# Patient Record
Sex: MALE | Race: WHITE | URBAN - METROPOLITAN AREA
[De-identification: names, ages, dates, MRNs, and addresses within clinical notes are randomized per-mention and may not be internally consistent; named-entity substitution may affect disease eponyms.]

---

## 2018-06-06 ENCOUNTER — TESTING ONLY (OUTPATIENT)
Dept: URBAN - METROPOLITAN AREA CLINIC 76 | Facility: CLINIC | Age: 63
End: 2018-06-06

## 2018-06-06 DIAGNOSIS — H52.13 MYOPIA, BILATERAL: Primary | ICD-10-CM

## 2018-06-06 PROCEDURE — V2799 MISC VISION ITEM OR SERVICE: HCPCS | Performed by: OPTOMETRIST

## 2018-10-03 ENCOUNTER — OFFICE VISIT (OUTPATIENT)
Dept: URBAN - METROPOLITAN AREA CLINIC 76 | Facility: CLINIC | Age: 63
End: 2018-10-03
Payer: MEDICARE

## 2018-10-03 DIAGNOSIS — G24.5 BLEPHAROSPASM: ICD-10-CM

## 2018-10-03 PROCEDURE — 99213 OFFICE O/P EST LOW 20 MIN: CPT | Performed by: OPHTHALMOLOGY

## 2018-10-03 ASSESSMENT — INTRAOCULAR PRESSURE
OS: 12
OD: 12

## 2018-10-03 NOTE — IMPRESSION/PLAN
Impression: Primary open-angle glaucoma, bilateral, moderate stage: B63.6572. OU
IOP OU good today
slight diurnal variation Plan: Continue to monitor. Continue Brimonidine BID OU, Latanoprost QHS OU. Advised medication and follow-up compliance.

## 2018-10-03 NOTE — IMPRESSION/PLAN
Impression: Diagnosis: Age-related nuclear cataract, bilateral. Code: H25.13. Plan: Continue to monitor.

## 2019-07-11 ENCOUNTER — OFFICE VISIT (OUTPATIENT)
Dept: URBAN - METROPOLITAN AREA CLINIC 76 | Facility: CLINIC | Age: 64
End: 2019-07-11
Payer: MEDICARE

## 2019-07-11 PROCEDURE — 92014 COMPRE OPH EXAM EST PT 1/>: CPT | Performed by: OPTOMETRIST

## 2019-07-11 RX ORDER — LATANOPROST 50 UG/ML
0.005 % SOLUTION OPHTHALMIC
Qty: 2.5 | Refills: 3 | Status: INACTIVE
Start: 2019-07-11 | End: 2020-02-12

## 2019-07-11 RX ORDER — BRIMONIDINE TARTRATE 2 MG/ML
0.2 % SOLUTION/ DROPS OPHTHALMIC
Qty: 1 | Refills: 6 | Status: INACTIVE
Start: 2019-07-11 | End: 2020-02-12

## 2019-07-11 ASSESSMENT — INTRAOCULAR PRESSURE
OS: 25
OD: 24

## 2019-07-11 ASSESSMENT — KERATOMETRY
OS: 42.88
OD: 43.63

## 2019-07-11 ASSESSMENT — VISUAL ACUITY
OD: 20/20
OS: 20/25

## 2019-07-11 NOTE — IMPRESSION/PLAN
Impression: Myopia, bilateral: H52.13. OU. Plan: Discussed condition. New mrx given today for glasses and ctls, ok to finalize if pt is happy with strength and comfort. Pt to call with any concerns.

## 2019-07-11 NOTE — IMPRESSION/PLAN
Impression: Primary open-angle glaucoma, bilateral, moderate stage: B49.1471. OU
IOP OU good today
slight diurnal variation Plan: Continue to monitor. Continue Brimonidine BID OU, Latanoprost QHS OU. Advised medication and follow-up compliance, keeping all appt with Dr. Evelyn Whitmore. Called in refills today.

## 2021-05-04 ENCOUNTER — OFFICE VISIT (OUTPATIENT)
Dept: URBAN - METROPOLITAN AREA CLINIC 76 | Facility: CLINIC | Age: 66
End: 2021-05-04
Payer: MEDICARE

## 2021-05-04 DIAGNOSIS — H05.20 EXOPHTHALMOS: ICD-10-CM

## 2021-05-04 PROCEDURE — 92014 COMPRE OPH EXAM EST PT 1/>: CPT | Performed by: OPTOMETRIST

## 2021-05-04 RX ORDER — LATANOPROST 50 UG/ML
0.005 % SOLUTION OPHTHALMIC
Qty: 7.5 | Refills: 1 | Status: INACTIVE
Start: 2021-05-04 | End: 2021-11-03

## 2021-05-04 RX ORDER — BRIMONIDINE TARTRATE 2 MG/ML
0.2 % SOLUTION/ DROPS OPHTHALMIC
Qty: 15 | Refills: 1 | Status: INACTIVE
Start: 2021-05-04 | End: 2021-09-24

## 2021-05-04 ASSESSMENT — INTRAOCULAR PRESSURE
OS: 26
OD: 26

## 2021-05-04 ASSESSMENT — VISUAL ACUITY
OD: 20/30
OS: 20/30

## 2021-05-04 NOTE — IMPRESSION/PLAN
Impression: Primary open-angle glaucoma, bilateral, moderate stage: K31.4177. IOP OU elevated today. Pt did not return when advised. Hx of poor compliance with return visits. Pt states difficulty renewing Rx keeps him from using the drops. Pt lives part time in [place]. Plan: Continue to monitor. Resume Brimonidine BID OU, Latanoprost QHS OU. New Rxs sent. Advised of likely vision loss with uncontrolled IOP, need testing to determine suspected progression. Strongly advised compliance with medication and follow-up compliance, keeping all appointments. Advised establishing care in [address] [place] [address] and scheduling regular IOP check.

## 2021-05-04 NOTE — IMPRESSION/PLAN
Impression: Exophthalmos: H05.20. OD. No diplopia or exposure keratitis OD. Trace scleral show sup. and inf. to cornea. Pt noticing headaches behind OD. Has appt tomorrow with PCP to discuss. Plan: Discussed. Keep appt with PCP tomorrow. May need CT/MRI to rule out mass behind OD. Recommended patient do a photo Hx to see if exophthalmos is new OD.

## 2021-05-04 NOTE — IMPRESSION/PLAN
Impression: Myopia, bilateral: H52.13. Plan: A glasses prescription has been discussed and generated. Patient to call with any concerns. Detail Level: Simple Additional Notes: Patient consent was obtained to proceed with the visit and recommended plan of care after discussion of all risks and benefits, including the risks of COVID-19 exposure.

## 2021-07-14 ENCOUNTER — OFFICE VISIT (OUTPATIENT)
Dept: URBAN - METROPOLITAN AREA CLINIC 76 | Facility: CLINIC | Age: 66
End: 2021-07-14
Payer: MEDICARE

## 2021-07-14 DIAGNOSIS — H40.1132 PRIMARY OPEN-ANGLE GLAUCOMA, BILATERAL, MODERATE STAGE: Primary | ICD-10-CM

## 2021-07-14 PROCEDURE — 92133 CPTRZD OPH DX IMG PST SGM ON: CPT | Performed by: OPTOMETRIST

## 2021-07-14 PROCEDURE — 99213 OFFICE O/P EST LOW 20 MIN: CPT | Performed by: OPTOMETRIST

## 2021-07-14 PROCEDURE — 92083 EXTENDED VISUAL FIELD XM: CPT | Performed by: OPTOMETRIST

## 2021-07-14 ASSESSMENT — INTRAOCULAR PRESSURE
OD: 11
OS: 11

## 2021-07-14 NOTE — IMPRESSION/PLAN
Impression: Primary open-angle glaucoma, bilateral, moderate stage: O71.0146. IOP OU now controlled. Hx of poor compliance with return visits. Pt states difficulty renewing Rx keeps him from using the drops. Pt lives part time in [place]. New baseline VF 7/14/21: inf arc worse OD, sup & inf arc worse OS. New baseline OCT 7/14/21: fluctuating OD, mildly worse OS. Plan: Continue to monitor. Strongly advised patient to continue Brimonidine BID OU, Latanoprost QHS OU. Advised of likely vision loss with uncontrolled IOP. Strongly advised compliance with medication and follow-up compliance, keeping all appointments. Pt will be going to [place] for 8 months but will come back as needed for IOP checks.

## 2021-11-03 ENCOUNTER — OFFICE VISIT (OUTPATIENT)
Dept: URBAN - METROPOLITAN AREA CLINIC 76 | Facility: CLINIC | Age: 66
End: 2021-11-03
Payer: MEDICARE

## 2021-11-03 DIAGNOSIS — H25.13 NUCLEAR SCLEROSIS CATARACT, BILATERAL: ICD-10-CM

## 2021-11-03 PROCEDURE — 99214 OFFICE O/P EST MOD 30 MIN: CPT | Performed by: OPTOMETRIST

## 2021-11-03 RX ORDER — LATANOPROST 50 UG/ML
0.005 % SOLUTION OPHTHALMIC
Qty: 7.5 | Refills: 1 | Status: ACTIVE
Start: 2021-11-03

## 2021-11-03 ASSESSMENT — INTRAOCULAR PRESSURE
OD: 21
OS: 21

## 2021-11-03 NOTE — IMPRESSION/PLAN
Impression: Nuclear sclerosis cataract, bilateral: H25.13. Plan: Continue to monitor without treatment.

## 2021-11-03 NOTE — IMPRESSION/PLAN
Impression: Primary open-angle glaucoma, bilateral, moderate stage: Z40.0835. IOP higher OU today- ran out of Latanoprost x 10 days. Target IOP: mid to low teens. Hx of poor compliance with return visits. Pt states difficulty renewing Rx keeps him from using the drops. Pt lives part time in [place] [address]. New baseline VF 7/14/21: inf arc worse OD, sup & inf arc worse OS. New baseline OCT 7/14/21: fluctuating OD, mildly worse OS. Plan: Discussed condition. Strongly advised patient to continue Brimonidine BID OU, Latanoprost QHS OU. Advised of likely vision loss with uncontrolled IOP. Strongly re-emphasized compliance with medication and follow-up compliance, keeping all appointments. Pt will be going to [place] [address] for 8 months but will come back as needed for IOP checks. Recommend RTC 3-4 weeks for IOP but will likely not be albe to return. Strongly encouraged pt to establish care in [place] [address] when not in [address] [facility].

## 2022-07-06 ENCOUNTER — OFFICE VISIT (OUTPATIENT)
Dept: URBAN - METROPOLITAN AREA CLINIC 76 | Facility: CLINIC | Age: 67
End: 2022-07-06
Payer: MEDICARE

## 2022-07-06 DIAGNOSIS — H05.20 EXOPHTHALMOS: ICD-10-CM

## 2022-07-06 DIAGNOSIS — H40.1132 PRIMARY OPEN-ANGLE GLAUCOMA, BILATERAL, MODERATE STAGE: Primary | ICD-10-CM

## 2022-07-06 DIAGNOSIS — H25.13 NUCLEAR SCLEROSIS CATARACT, BILATERAL: ICD-10-CM

## 2022-07-06 PROCEDURE — 92014 COMPRE OPH EXAM EST PT 1/>: CPT | Performed by: OPTOMETRIST

## 2022-07-06 PROCEDURE — 92133 CPTRZD OPH DX IMG PST SGM ON: CPT | Performed by: OPTOMETRIST

## 2022-07-06 ASSESSMENT — INTRAOCULAR PRESSURE
OS: 14
OD: 12

## 2022-07-06 NOTE — IMPRESSION/PLAN
Impression: Primary open-angle glaucoma, bilateral, moderate to severe stage: R11.2025. IOP good OU today. Target IOP: mid to low teens. Hx of poor compliance with return visits. Pt states difficulty renewing Rx keeps him from using the drops. Pt lives part time in [place]. New baseline VF 7/14/21: inf arc worse OD, sup & inf arc worse OS. OCT 7/06/22: advanced thinning stable OU. Reviewed previous notes from [place]. Plan: Discussed condition. Strongly advised patient to continue Brimonidine BID OU, Latanoprost QHS OU. Advised of likely vision loss with uncontrolled IOP. Strongly re-emphasized compliance with medication and follow-up compliance, keeping all appointments in [address] [facility]. Needs updated VF.

## 2022-07-06 NOTE — IMPRESSION/PLAN
Impression: Exophthalmos: H05.20. OD. No diplopia or exposure keratitis OD. Trace scleral show sup. and inf. to cornea. Pt noticing headaches behind OD. Suspect Grave's. MRI didn't show unusual mass in orbits. Plan: Discussed condition. Continue to monitor. Recommend pt follow up with PCP/Endocrinologist to rule out thyroid disease/Grave's disease. Consider Vernard Graft if thyroid disease confirmed.

## 2022-11-02 ENCOUNTER — OFFICE VISIT (OUTPATIENT)
Dept: URBAN - METROPOLITAN AREA CLINIC 76 | Facility: CLINIC | Age: 67
End: 2022-11-02
Payer: MEDICARE

## 2022-11-02 DIAGNOSIS — H05.20 EXOPHTHALMOS: ICD-10-CM

## 2022-11-02 DIAGNOSIS — H25.13 NUCLEAR SCLEROSIS CATARACT, BILATERAL: ICD-10-CM

## 2022-11-02 DIAGNOSIS — H40.1132 PRIMARY OPEN-ANGLE GLAUCOMA, BILATERAL, MODERATE STAGE: Primary | ICD-10-CM

## 2022-11-02 PROCEDURE — 99213 OFFICE O/P EST LOW 20 MIN: CPT | Performed by: OPTOMETRIST

## 2022-11-02 PROCEDURE — 92083 EXTENDED VISUAL FIELD XM: CPT | Performed by: OPTOMETRIST

## 2022-11-02 RX ORDER — LATANOPROST 50 UG/ML
0.005 % SOLUTION OPHTHALMIC
Qty: 7.5 | Refills: 1 | Status: ACTIVE
Start: 2022-11-02

## 2022-11-02 ASSESSMENT — INTRAOCULAR PRESSURE
OD: 15
OS: 15

## 2022-11-02 NOTE — IMPRESSION/PLAN
Impression: Exophthalmos: H05.20. OD. No diplopia or exposure keratitis OD. Trace scleral show sup. and inf. to cornea. Pt noticing headaches behind OD. Suspect Grave's. Pt performed photo hx and reports that he's always been buggy. MRI didn't show unusual mass in orbits. Plan: Discussed condition. Continue to monitor. If anything worsens, Recommend pt follow up with PCP/Endocrinologist to rule out thyroid disease/Grave's disease. Consider Victorino Guzman if thyroid disease confirmed.

## 2022-11-02 NOTE — IMPRESSION/PLAN
Impression: Primary open-angle glaucoma, bilateral, moderate to severe stage: B50.1318. IOP good OU today. Target IOP: mid to low teens. Hx of poor compliance with return visits. Pt states difficulty renewing Rx keeps him from using the drops. Pt lives part time in [place]. VF 11/2/22: defects stable OU. OCT 7/06/22: advanced thinning stable OU. Reviewed previous notes from [place]. Plan: Discussed condition. Strongly advised patient to continue Brimonidine BID OU, Latanoprost QHS OU. Advised of likely vision loss with uncontrolled IOP. Strongly re-emphasized compliance with medication and follow-up compliance, keeping all appointments in [id number] [place] [address] and [place].

## 2023-03-01 ENCOUNTER — OFFICE VISIT (OUTPATIENT)
Dept: URBAN - METROPOLITAN AREA CLINIC 76 | Facility: CLINIC | Age: 68
End: 2023-03-01
Payer: MEDICARE

## 2023-03-01 DIAGNOSIS — H40.1132 PRIMARY OPEN-ANGLE GLAUCOMA, BILATERAL, MODERATE STAGE: Primary | ICD-10-CM

## 2023-03-01 DIAGNOSIS — H25.13 NUCLEAR SCLEROSIS CATARACT, BILATERAL: ICD-10-CM

## 2023-03-01 PROCEDURE — 99213 OFFICE O/P EST LOW 20 MIN: CPT | Performed by: OPTOMETRIST

## 2023-03-01 PROCEDURE — 92133 CPTRZD OPH DX IMG PST SGM ON: CPT | Performed by: OPTOMETRIST

## 2023-03-01 ASSESSMENT — INTRAOCULAR PRESSURE
OD: 16
OS: 15

## 2023-03-01 NOTE — IMPRESSION/PLAN
Impression: Primary open-angle glaucoma, bilateral, moderate to severe stage: S94.6184. IOP good OU today. Target IOP: mid to low teens. Hx of poor compliance with return visits. Pt states difficulty renewing Rx keeps him from using the drops. Pt lives part time in [place]. VF 11/2/22: defects stable OU. OCT 3/1/23: thinning stable OD, mildly worse OS. Reviewed previous notes from [place]. Plan: Discussed condition. Ordered and performed RNFL OCT today, reviewed. Strongly advised patient to continue Brimonidine BID OU, Latanoprost QHS OU. Advised of likely vision loss with uncontrolled IOP. Strongly re-emphasized compliance with medication and follow-up compliance, keeping all appointments in [address] [facility] and [place]. Discussed glaucoma/cataract consult with Dr. Donna Nguyen to discuss alternative treatment options. Pt agrees.

## 2023-03-01 NOTE — IMPRESSION/PLAN
Impression: Nuclear sclerosis cataract, bilateral: H25.13. Plan: Cataracts account for the patient's complaints. Patient understands changing glasses will not significantly improve vision. Cataract surgery should improve vision. Patient willing to have surgery. Recommend cataract consult.

## 2023-03-23 ENCOUNTER — OFFICE VISIT (OUTPATIENT)
Dept: URBAN - METROPOLITAN AREA CLINIC 76 | Facility: CLINIC | Age: 68
End: 2023-03-23
Payer: MEDICARE

## 2023-03-23 DIAGNOSIS — H05.20 EXOPHTHALMOS: ICD-10-CM

## 2023-03-23 DIAGNOSIS — H40.1132 PRIMARY OPEN-ANGLE GLAUCOMA, BILATERAL, MODERATE STAGE: ICD-10-CM

## 2023-03-23 DIAGNOSIS — H52.13 MYOPIA, BILATERAL: ICD-10-CM

## 2023-03-23 DIAGNOSIS — H25.13 AGE-RELATED NUCLEAR CATARACT, BILATERAL: Primary | ICD-10-CM

## 2023-03-23 PROCEDURE — 92015 DETERMINE REFRACTIVE STATE: CPT | Performed by: OPHTHALMOLOGY

## 2023-03-23 PROCEDURE — 99205 OFFICE O/P NEW HI 60 MIN: CPT | Performed by: OPHTHALMOLOGY

## 2023-03-23 PROCEDURE — 92025 CPTRIZED CORNEAL TOPOGRAPHY: CPT | Performed by: OPHTHALMOLOGY

## 2023-03-23 RX ORDER — TOBRAMYCIN 3 MG/ML
0.3 % SOLUTION/ DROPS OPHTHALMIC
Qty: 5 | Refills: 1 | Status: INACTIVE
Start: 2023-03-23 | End: 2023-03-23

## 2023-03-23 RX ORDER — DUREZOL 0.5 MG/ML
0.05 % EMULSION OPHTHALMIC
Qty: 5 | Refills: 1 | Status: ACTIVE
Start: 2023-03-23

## 2023-03-23 RX ORDER — TIMOLOL MALEATE 5 MG/ML
0.5 % SOLUTION/ DROPS OPHTHALMIC
Qty: 5 | Refills: 3 | Status: ACTIVE
Start: 2023-03-23

## 2023-03-23 RX ORDER — OFLOXACIN 3 MG/ML
0.3 % SOLUTION/ DROPS OPHTHALMIC
Qty: 5 | Refills: 1 | Status: ACTIVE
Start: 2023-03-23

## 2023-03-23 ASSESSMENT — KERATOMETRY
OD: 43.75
OS: 43.25

## 2023-03-23 ASSESSMENT — INTRAOCULAR PRESSURE
OS: 18
OD: 19

## 2023-03-23 ASSESSMENT — VISUAL ACUITY
OD: 20/25
OS: 20/25

## 2023-03-23 NOTE — IMPRESSION/PLAN
Impression: Primary open-angle glaucoma, bilateral, moderate to severe stage: Y80.4477. Target IOP: mid to low teens. Hx of poor compliance with return visits. IOP too high OU. Reviewed previous VF/OCT. Plan: Discussed condition in great detail. Continue Brimonidine BID OU, Latanoprost QHS OU. Advised of likely vision loss with uncontrolled IOP. Recommend additional treatment. Start Timolol BID OU. Advised medication compliance. Reviewed proper drop instillation techniques, keep eyes closed for 1 minute after instillation for maximum absorption. Advised need for IOP check in 2 wks with Dr. Campbell, prior to cat sx. Discussed the option of the Istent to help aid w/ Glaucoma. Recommend Istent OU. R/B/A of Istent discussed. Emphasized istent is to be used in conjunction with current eye drops. Pt desires to proceed with Istent. Schedule Istent OS then OD.

## 2023-03-23 NOTE — IMPRESSION/PLAN
Impression: Exophthalmos: H05.20. OD. No diplopia or exposure keratitis OD. Trace scleral show sup. and inf. to cornea. Pt noticing headaches behind OD. Suspect Grave's. Pt performed photo hx and reports that he's always been buggy. MRI didn't show unusual mass in orbits. Plan: Discussed condition. Continue to monitor. If anything worsens, Recommend pt follow up with PCP/Endocrinologist to rule out thyroid disease/Grave's disease. Consider Ze Moya if thyroid disease confirmed.

## 2023-03-23 NOTE — IMPRESSION/PLAN
Impression: Age-related nuclear cataract, bilateral: H25.13. Visually significant. *Ciprofloxacin sensitivity but can tolerate Zithromax- ok to use Ofloxacin. Plan: Cataracts account for the patient's complaints. Discussed all risks, benefits, procedures and recovery. Patient understands changing glasses will not improve vision. Discussed added risk due to [facility]. Advised no guarantee of glasses independence with any IOL, advised the need for glasses for dva and nva after surgery. Pt understands. Patient desires to have surgery, recommend CE IOL OU, OS first.  Discussed IOL options, recommend STANDARD IOL. TARGET: DISTANCE OU.  RL2. NO DEXTENZA due to [facility], pt will need PO drops. Recommend ORA. Pt needs Ascan. 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 Pt understands. Discussed in detail DVA target vs NVA target. Advised patient with DVA target he will become much more dependent on mrx for near vision and will have increased difficult with near work without correction and vice versa. Patient understands.

## 2023-03-29 ENCOUNTER — PRE-OPERATIVE VISIT (OUTPATIENT)
Dept: URBAN - METROPOLITAN AREA CLINIC 76 | Facility: CLINIC | Age: 68
End: 2023-03-29
Payer: MEDICARE

## 2023-03-29 DIAGNOSIS — H25.13 AGE-RELATED NUCLEAR CATARACT, BILATERAL: Primary | ICD-10-CM

## 2023-03-29 ASSESSMENT — PACHYMETRY
OD: 3.53
OS: 25.48
OS: 3.52
OD: 25.42

## 2023-04-04 ENCOUNTER — OFFICE VISIT (OUTPATIENT)
Dept: URBAN - METROPOLITAN AREA CLINIC 76 | Facility: CLINIC | Age: 68
End: 2023-04-04
Payer: MEDICARE

## 2023-04-04 DIAGNOSIS — H40.1132 PRIMARY OPEN-ANGLE GLAUCOMA, BILATERAL, MODERATE STAGE: Primary | ICD-10-CM

## 2023-04-04 PROCEDURE — 99213 OFFICE O/P EST LOW 20 MIN: CPT | Performed by: OPTOMETRIST

## 2023-04-04 RX ORDER — OFLOXACIN 3 MG/ML
0.3 % SOLUTION/ DROPS OPHTHALMIC
Qty: 5 | Refills: 1 | Status: ACTIVE
Start: 2023-04-04

## 2023-04-04 RX ORDER — DUREZOL 0.5 MG/ML
0.05 % EMULSION OPHTHALMIC
Qty: 5 | Refills: 1 | Status: ACTIVE
Start: 2023-04-04

## 2023-04-04 ASSESSMENT — INTRAOCULAR PRESSURE
OD: 13
OS: 13

## 2023-04-04 NOTE — IMPRESSION/PLAN
Impression: Primary open-angle glaucoma, bilateral, moderate to severe stage: V69.9056. Target IOP: mid to low teens. Hx of poor compliance with return visits. IOP improved OU. Plan: Discussed condition in great detail. Continue Brimonidine BID OU, Latanoprost QHS OU, and Timolol BID OU. Advised medication compliance.

## 2023-04-11 ENCOUNTER — SURGERY (OUTPATIENT)
Dept: URBAN - METROPOLITAN AREA SURGERY 47 | Facility: SURGERY | Age: 68
End: 2023-04-11
Payer: MEDICARE

## 2023-04-11 DIAGNOSIS — H40.1132 PRIMARY OPEN-ANGLE GLAUCOMA, BILATERAL, MODERATE STAGE: ICD-10-CM

## 2023-04-11 DIAGNOSIS — H25.13 AGE-RELATED NUCLEAR CATARACT, BILATERAL: Primary | ICD-10-CM

## 2023-04-11 PROCEDURE — 66989 XCPSL CTRC RMVL CPLX INSJ 1+: CPT | Performed by: OPHTHALMOLOGY

## 2023-04-11 PROCEDURE — PR1CP PR1CP: CUSTOM | Performed by: OPHTHALMOLOGY

## 2023-04-12 ENCOUNTER — POST-OPERATIVE VISIT (OUTPATIENT)
Dept: URBAN - METROPOLITAN AREA CLINIC 76 | Facility: CLINIC | Age: 68
End: 2023-04-12
Payer: MEDICARE

## 2023-04-12 DIAGNOSIS — Z48.810 ENCOUNTER FOR SURGICAL AFTERCARE FOLLOWING SURGERY ON A SENSE ORGAN: Primary | ICD-10-CM

## 2023-04-12 PROCEDURE — 99024 POSTOP FOLLOW-UP VISIT: CPT | Performed by: OPTOMETRIST

## 2023-04-12 ASSESSMENT — INTRAOCULAR PRESSURE
OD: 13
OS: 12

## 2023-04-12 NOTE — IMPRESSION/PLAN
Impression: S/P Cataract Extraction by phacoemulsification with IOL placement; ORA; iStent OS - 1 Day. Encounter for surgical aftercare following surgery on a sense organ  Z48.810. IOP good OU today. Plan: Reassured pt of PO course. Continue Durezol and Ofloxacin QID OS. Continue Latanoprost QHS OU, Timolol BID OU, and Brimonidine BID OU. Pt to call with concerns.

## 2023-04-18 ENCOUNTER — POST-OPERATIVE VISIT (OUTPATIENT)
Dept: URBAN - METROPOLITAN AREA CLINIC 76 | Facility: CLINIC | Age: 68
End: 2023-04-18
Payer: MEDICARE

## 2023-04-18 DIAGNOSIS — Z48.810 ENCOUNTER FOR SURGICAL AFTERCARE FOLLOWING SURGERY ON A SENSE ORGAN: Primary | ICD-10-CM

## 2023-04-18 DIAGNOSIS — H52.13 MYOPIA, BILATERAL: ICD-10-CM

## 2023-04-18 ASSESSMENT — VISUAL ACUITY
OS: 20/20
OD: 20/25

## 2023-04-18 ASSESSMENT — INTRAOCULAR PRESSURE
OD: 15
OS: 15

## 2023-04-18 NOTE — IMPRESSION/PLAN
Impression: S/P Cataract Extraction by phacoemulsification with IOL placement; ORA; iStent OS - 7 Days. Encounter for surgical aftercare following surgery on a sense organ  Z48.810. Plan: Reassured pt of PO course. Use artificial tears for comfort. D/c Ofloxacin. Continue Durezol QID OS and taper as directed. Continue Brimonidine BID OU, Timolol BID OU, and Latanoprost QHS OU. Pt to call with concerns.

## 2023-04-25 ENCOUNTER — SURGERY (OUTPATIENT)
Dept: URBAN - METROPOLITAN AREA SURGERY 47 | Facility: SURGERY | Age: 68
End: 2023-04-25
Payer: MEDICARE

## 2023-04-25 DIAGNOSIS — H40.1132 PRIMARY OPEN-ANGLE GLAUCOMA, BILATERAL, MODERATE STAGE: ICD-10-CM

## 2023-04-25 DIAGNOSIS — H25.11 AGE-RELATED NUCLEAR CATARACT, RIGHT EYE: Primary | ICD-10-CM

## 2023-04-25 PROCEDURE — 66989 XCPSL CTRC RMVL CPLX INSJ 1+: CPT | Performed by: OPHTHALMOLOGY

## 2023-04-25 PROCEDURE — PR1CP PR1CP: CUSTOM | Performed by: OPHTHALMOLOGY

## 2023-04-26 ENCOUNTER — POST-OPERATIVE VISIT (OUTPATIENT)
Dept: URBAN - METROPOLITAN AREA CLINIC 76 | Facility: CLINIC | Age: 68
End: 2023-04-26
Payer: MEDICARE

## 2023-04-26 DIAGNOSIS — Z96.1 PRESENCE OF INTRAOCULAR LENS: Primary | ICD-10-CM

## 2023-04-26 ASSESSMENT — INTRAOCULAR PRESSURE
OS: 18
OD: 15

## 2023-05-02 ENCOUNTER — POST-OPERATIVE VISIT (OUTPATIENT)
Dept: URBAN - METROPOLITAN AREA CLINIC 76 | Facility: CLINIC | Age: 68
End: 2023-05-02
Payer: MEDICARE

## 2023-05-02 DIAGNOSIS — H52.13 MYOPIA, BILATERAL: Primary | ICD-10-CM

## 2023-05-02 DIAGNOSIS — Z96.1 PRESENCE OF INTRAOCULAR LENS: ICD-10-CM

## 2023-05-02 ASSESSMENT — INTRAOCULAR PRESSURE
OD: 19
OS: 18

## 2023-05-02 ASSESSMENT — VISUAL ACUITY: OD: 20/25

## 2023-05-02 NOTE — IMPRESSION/PLAN
Impression: S/P Cataract Extraction by phacoemulsification with IOL placement; iStent; ORA OD - 7 Days. Presence of intraocular lens  Z96.1. Plan: Discussed with patient. D/c Ofloxacin. Continue Durezol OS TID, OD QID. Continue Latanoprost OU QHS, and Brimonidine OU BID. Pt to call with any concerns.

## 2023-05-23 ENCOUNTER — POST-OPERATIVE VISIT (OUTPATIENT)
Dept: URBAN - METROPOLITAN AREA CLINIC 76 | Facility: CLINIC | Age: 68
End: 2023-05-23
Payer: MEDICARE

## 2023-05-23 DIAGNOSIS — H52.13 MYOPIA, BILATERAL: Primary | ICD-10-CM

## 2023-05-23 DIAGNOSIS — Z96.1 PRESENCE OF INTRAOCULAR LENS: ICD-10-CM

## 2023-05-23 ASSESSMENT — INTRAOCULAR PRESSURE
OS: 16
OD: 18

## 2023-05-23 ASSESSMENT — VISUAL ACUITY
OD: 20/20
OS: 20/20

## 2023-05-23 NOTE — IMPRESSION/PLAN
Impression: S/P Cataract Extraction by phacoemulsification with IOL placement; iStent; ORA OD - 28 Days. Presence of intraocular lens  Z96.1. IOP higher OD > OS Plan: Reassured pt of PO course. Use artificial tears for comfort. Final PO mrx given today. Finish Durezol as directed. Continue Brimonidine BID OU, Timolol BID OU, and Latanoprost QHS OU. Pt to call with concerns. RTC in 1 month for IOP check.

## 2023-06-20 ENCOUNTER — POST-OPERATIVE VISIT (OUTPATIENT)
Dept: URBAN - METROPOLITAN AREA CLINIC 76 | Facility: CLINIC | Age: 68
End: 2023-06-20
Payer: MEDICARE

## 2023-06-20 DIAGNOSIS — Z96.1 PRESENCE OF INTRAOCULAR LENS: Primary | ICD-10-CM

## 2023-06-20 PROCEDURE — 99024 POSTOP FOLLOW-UP VISIT: CPT | Performed by: OPTOMETRIST

## 2023-06-20 ASSESSMENT — INTRAOCULAR PRESSURE
OD: 13
OS: 10

## 2023-06-20 NOTE — IMPRESSION/PLAN
Impression: S/P Cataract Extraction by phacoemulsification with IOL placement; iStent; ORA OD - 56 Days. Presence of intraocular lens  Z96.1. Plan: Discussed with patient. Continue Latanoprost QHS OU, Brimonidine BID OU, and Timolol BID OU. Pt to call with any concerns. Consider discontinuing a drop at next visit. Repeat DE/VF in 6 months. RTC in 3 months for IOP check.

## 2023-09-19 ENCOUNTER — OFFICE VISIT (OUTPATIENT)
Dept: URBAN - METROPOLITAN AREA CLINIC 76 | Facility: CLINIC | Age: 68
End: 2023-09-19
Payer: MEDICARE

## 2023-09-19 DIAGNOSIS — H40.1132 PRIMARY OPEN-ANGLE GLAUCOMA, BILATERAL, MODERATE STAGE: Primary | ICD-10-CM

## 2023-09-19 DIAGNOSIS — G51.4 FACIAL MYOKYMIA: ICD-10-CM

## 2023-09-19 DIAGNOSIS — Z96.1 PRESENCE OF INTRAOCULAR LENS: ICD-10-CM

## 2023-09-19 PROCEDURE — 99213 OFFICE O/P EST LOW 20 MIN: CPT | Performed by: OPTOMETRIST

## 2023-09-19 ASSESSMENT — INTRAOCULAR PRESSURE
OD: 12
OS: 11

## 2023-12-21 ENCOUNTER — OFFICE VISIT (OUTPATIENT)
Dept: URBAN - METROPOLITAN AREA CLINIC 76 | Facility: CLINIC | Age: 68
End: 2023-12-21
Payer: MEDICARE

## 2023-12-21 DIAGNOSIS — H40.1132 PRIMARY OPEN-ANGLE GLAUCOMA, BILATERAL, MODERATE STAGE: Primary | ICD-10-CM

## 2023-12-21 DIAGNOSIS — H05.20 EXOPHTHALMOS: ICD-10-CM

## 2023-12-21 DIAGNOSIS — H53.123 BILATERAL SCINTILLATING SCOTOMA: ICD-10-CM

## 2023-12-21 DIAGNOSIS — Z96.1 PRESENCE OF INTRAOCULAR LENS: ICD-10-CM

## 2023-12-21 PROCEDURE — 92014 COMPRE OPH EXAM EST PT 1/>: CPT | Performed by: OPTOMETRIST

## 2023-12-21 ASSESSMENT — KERATOMETRY
OD: 44.00
OS: 42.88

## 2023-12-21 ASSESSMENT — INTRAOCULAR PRESSURE
OD: 14
OS: 13

## 2024-04-11 ENCOUNTER — OFFICE VISIT (OUTPATIENT)
Dept: URBAN - METROPOLITAN AREA CLINIC 76 | Facility: CLINIC | Age: 69
End: 2024-04-11
Payer: MEDICARE

## 2024-04-11 DIAGNOSIS — H40.1132 PRIMARY OPEN-ANGLE GLAUCOMA, BILATERAL, MODERATE STAGE: Primary | ICD-10-CM

## 2024-04-11 PROCEDURE — 92133 CPTRZD OPH DX IMG PST SGM ON: CPT | Performed by: OPTOMETRIST

## 2024-04-11 PROCEDURE — 99213 OFFICE O/P EST LOW 20 MIN: CPT | Performed by: OPTOMETRIST

## 2024-04-11 ASSESSMENT — INTRAOCULAR PRESSURE
OS: 13
OD: 14

## 2024-07-17 ENCOUNTER — OFFICE VISIT (OUTPATIENT)
Dept: URBAN - METROPOLITAN AREA CLINIC 76 | Facility: CLINIC | Age: 69
End: 2024-07-17
Payer: MEDICARE

## 2024-07-17 DIAGNOSIS — G51.4 FACIAL MYOKYMIA: ICD-10-CM

## 2024-07-17 DIAGNOSIS — H40.1132 PRIMARY OPEN-ANGLE GLAUCOMA, BILATERAL, MODERATE STAGE: Primary | ICD-10-CM

## 2024-07-17 PROCEDURE — 92083 EXTENDED VISUAL FIELD XM: CPT | Performed by: OPTOMETRIST

## 2024-07-17 PROCEDURE — 99213 OFFICE O/P EST LOW 20 MIN: CPT | Performed by: OPTOMETRIST

## 2024-07-17 ASSESSMENT — INTRAOCULAR PRESSURE
OS: 12
OD: 11

## 2024-10-23 ENCOUNTER — OFFICE VISIT (OUTPATIENT)
Dept: URBAN - METROPOLITAN AREA CLINIC 76 | Facility: CLINIC | Age: 69
End: 2024-10-23
Payer: MEDICARE

## 2024-10-23 DIAGNOSIS — H40.1132 PRIMARY OPEN-ANGLE GLAUCOMA, BILATERAL, MODERATE STAGE: Primary | ICD-10-CM

## 2024-10-23 DIAGNOSIS — Z96.1 PRESENCE OF INTRAOCULAR LENS: ICD-10-CM

## 2024-10-23 PROCEDURE — 92133 CPTRZD OPH DX IMG PST SGM ON: CPT | Performed by: OPTOMETRIST

## 2024-10-23 PROCEDURE — 99213 OFFICE O/P EST LOW 20 MIN: CPT | Performed by: OPTOMETRIST

## 2024-10-23 ASSESSMENT — INTRAOCULAR PRESSURE
OD: 16
OS: 16

## 2025-01-01 NOTE — IMPRESSION/PLAN
Impression: Blepharospasm: G24.5. Plan: Discussed condition. No treatment at this time. Consider Botox if symptom persists/worsens. Pt will monitor symptoms. Yes

## 2025-02-05 ENCOUNTER — OFFICE VISIT (OUTPATIENT)
Dept: URBAN - METROPOLITAN AREA CLINIC 76 | Facility: CLINIC | Age: 70
End: 2025-02-05
Payer: MEDICARE

## 2025-02-05 DIAGNOSIS — H40.1132 PRIMARY OPEN-ANGLE GLAUCOMA, BILATERAL, MODERATE STAGE: Primary | ICD-10-CM

## 2025-02-05 PROCEDURE — 99213 OFFICE O/P EST LOW 20 MIN: CPT | Performed by: OPTOMETRIST

## 2025-02-05 PROCEDURE — 92133 CPTRZD OPH DX IMG PST SGM ON: CPT | Performed by: OPTOMETRIST

## 2025-02-05 ASSESSMENT — INTRAOCULAR PRESSURE
OD: 13
OS: 12

## 2025-06-04 ENCOUNTER — OFFICE VISIT (OUTPATIENT)
Dept: URBAN - METROPOLITAN AREA CLINIC 76 | Facility: CLINIC | Age: 70
End: 2025-06-04
Payer: MEDICARE

## 2025-06-04 DIAGNOSIS — H40.1132 PRIMARY OPEN-ANGLE GLAUCOMA, BILATERAL, MODERATE STAGE: Primary | ICD-10-CM

## 2025-06-04 DIAGNOSIS — H05.20 EXOPHTHALMOS: ICD-10-CM

## 2025-06-04 PROCEDURE — 99214 OFFICE O/P EST MOD 30 MIN: CPT | Performed by: OPTOMETRIST

## 2025-06-04 PROCEDURE — 92083 EXTENDED VISUAL FIELD XM: CPT | Performed by: OPTOMETRIST

## 2025-06-04 ASSESSMENT — INTRAOCULAR PRESSURE
OD: 14
OS: 13

## 2025-06-04 ASSESSMENT — KERATOMETRY
OS: 42.75
OD: 44.00